# Patient Record
Sex: FEMALE | Race: BLACK OR AFRICAN AMERICAN | NOT HISPANIC OR LATINO | URBAN - METROPOLITAN AREA
[De-identification: names, ages, dates, MRNs, and addresses within clinical notes are randomized per-mention and may not be internally consistent; named-entity substitution may affect disease eponyms.]

---

## 2022-04-11 ENCOUNTER — HISTORICAL (OUTPATIENT)
Dept: ADMINISTRATIVE | Facility: HOSPITAL | Age: 32
End: 2022-04-11

## 2022-04-27 VITALS
BODY MASS INDEX: 26.4 KG/M2 | WEIGHT: 149 LBS | DIASTOLIC BLOOD PRESSURE: 62 MMHG | HEIGHT: 63 IN | SYSTOLIC BLOOD PRESSURE: 110 MMHG

## 2023-04-17 ENCOUNTER — DOCUMENTATION ONLY (OUTPATIENT)
Dept: OBSTETRICS AND GYNECOLOGY | Facility: CLINIC | Age: 33
End: 2023-04-17

## 2023-04-17 ENCOUNTER — OFFICE VISIT (OUTPATIENT)
Dept: OBSTETRICS AND GYNECOLOGY | Facility: CLINIC | Age: 33
End: 2023-04-17
Payer: MEDICAID

## 2023-04-17 VITALS
HEART RATE: 88 BPM | WEIGHT: 177 LBS | HEIGHT: 60 IN | SYSTOLIC BLOOD PRESSURE: 126 MMHG | DIASTOLIC BLOOD PRESSURE: 70 MMHG | BODY MASS INDEX: 34.75 KG/M2

## 2023-04-17 DIAGNOSIS — N92.1 MENORRHAGIA WITH IRREGULAR CYCLE: ICD-10-CM

## 2023-04-17 DIAGNOSIS — N92.6 IRREGULAR MENSTRUATION: ICD-10-CM

## 2023-04-17 DIAGNOSIS — D64.9 ANEMIA, UNSPECIFIED TYPE: ICD-10-CM

## 2023-04-17 DIAGNOSIS — Z01.419 WOMEN'S ANNUAL ROUTINE GYNECOLOGICAL EXAMINATION: Primary | ICD-10-CM

## 2023-04-17 PROCEDURE — 3074F SYST BP LT 130 MM HG: CPT | Mod: CPTII,,, | Performed by: NURSE PRACTITIONER

## 2023-04-17 PROCEDURE — 99395 PR PREVENTIVE VISIT,EST,18-39: ICD-10-PCS | Mod: 24,,, | Performed by: NURSE PRACTITIONER

## 2023-04-17 PROCEDURE — 3074F PR MOST RECENT SYSTOLIC BLOOD PRESSURE < 130 MM HG: ICD-10-PCS | Mod: CPTII,,, | Performed by: NURSE PRACTITIONER

## 2023-04-17 PROCEDURE — 99395 PREV VISIT EST AGE 18-39: CPT | Mod: 24,,, | Performed by: NURSE PRACTITIONER

## 2023-04-17 PROCEDURE — 3078F PR MOST RECENT DIASTOLIC BLOOD PRESSURE < 80 MM HG: ICD-10-PCS | Mod: CPTII,,, | Performed by: NURSE PRACTITIONER

## 2023-04-17 PROCEDURE — 1160F PR REVIEW ALL MEDS BY PRESCRIBER/CLIN PHARMACIST DOCUMENTED: ICD-10-PCS | Mod: CPTII,,, | Performed by: NURSE PRACTITIONER

## 2023-04-17 PROCEDURE — 1159F MED LIST DOCD IN RCRD: CPT | Mod: CPTII,,, | Performed by: NURSE PRACTITIONER

## 2023-04-17 PROCEDURE — 3008F BODY MASS INDEX DOCD: CPT | Mod: CPTII,,, | Performed by: NURSE PRACTITIONER

## 2023-04-17 PROCEDURE — 3078F DIAST BP <80 MM HG: CPT | Mod: CPTII,,, | Performed by: NURSE PRACTITIONER

## 2023-04-17 PROCEDURE — 3008F PR BODY MASS INDEX (BMI) DOCUMENTED: ICD-10-PCS | Mod: CPTII,,, | Performed by: NURSE PRACTITIONER

## 2023-04-17 PROCEDURE — 1159F PR MEDICATION LIST DOCUMENTED IN MEDICAL RECORD: ICD-10-PCS | Mod: CPTII,,, | Performed by: NURSE PRACTITIONER

## 2023-04-17 PROCEDURE — 1160F RVW MEDS BY RX/DR IN RCRD: CPT | Mod: CPTII,,, | Performed by: NURSE PRACTITIONER

## 2023-04-17 RX ORDER — FERROUS SULFATE 325(65) MG
325 TABLET, DELAYED RELEASE (ENTERIC COATED) ORAL
COMMUNITY

## 2023-04-17 RX ORDER — HYDROXYZINE PAMOATE 50 MG/1
100 CAPSULE ORAL 2 TIMES DAILY
COMMUNITY
Start: 2023-02-09

## 2023-04-17 RX ORDER — SERTRALINE HYDROCHLORIDE 25 MG/1
25 TABLET, FILM COATED ORAL
COMMUNITY
Start: 2023-02-09

## 2023-04-17 NOTE — PROGRESS NOTES
Patient ID: [id number]   Chief Complaint: Annual exam  Chief Complaint   Patient presents with    Well Woman     C/O irregular heavy cycles. States she has been experiencing this ongoing, has had blood transfusion. Recently had pelvic US     HPI:   Concetta Hernandez is a 32 y.o. year old  here for her Annual Exam with Dr Rivera, however he is out of clinic due to an emergency. Patient's last menstrual period was 2023. She is doing well. Denies any health changes. C/O irregular heavy cycles. States she has been experiencing this ongoing, has had blood transfusions. Recently had pelvic US, no results available today. Called for results us and labs.    Subjective:     Past Medical History:   Diagnosis Date    Anemia     Anxiety disorder, unspecified     Depression      Past Surgical History:   Procedure Laterality Date    TUBAL LIGATION       Social History     Tobacco Use    Smoking status: Never    Smokeless tobacco: Never   Substance Use Topics    Alcohol use: Never    Drug use: Never     Family History   Problem Relation Age of Onset    Cancer Maternal Grandmother     Cancer Father      OB History    Para Term  AB Living   3 3 3     2   SAB IAB Ectopic Multiple Live Births           2      # Outcome Date GA Lbr Higinio/2nd Weight Sex Delivery Anes PTL Lv   3 Term 18 39w0d    CS-Unspec Spinal     2 Term 17 38w0d  4.082 kg (9 lb) F CS-Unspec Spinal  BRIANNE   1 Term 08/07/15 39w0d  2.807 kg (6 lb 3 oz) F CS-LTranv EPI  BRIANNE       Current Outpatient Medications:     ferrous sulfate 325 (65 FE) MG EC tablet, Take 325 mg by mouth 3 (three) times daily with meals., Disp: , Rfl:     hydrOXYzine pamoate (VISTARIL) 50 MG Cap, Take 100 mg by mouth 2 (two) times daily., Disp: , Rfl:     sertraline (ZOLOFT) 25 MG tablet, Take 25 mg by mouth., Disp: , Rfl:   MENARCHEAL:  Cycle Length: 5 days   Flow: heavy  Dysmenorrhea: Yes  If yes: Severe  Intermenstrual Bleeding: Yes  PAP:  Last PAP: No result  found    History of Abnormal PAP Smear: NO    INTERCOURSE:  Dyspareunia: Yes  Postcoital Bleeding: No  History of STI: No  Current Birth Control Method: none  Sexually Active: yes    Review of Systems 12 point review of systems conducted, negative except as stated in the history of present illness. See HPI for details.  Objective:   Visit Vitals  /70   Pulse 88   Ht 5' (1.524 m)   Wt 80.3 kg (177 lb)   LMP 03/31/2023   BMI 34.57 kg/m²     Physical Exam:  Physical Exam  Constitutional:  General Appearance : alert, in no acute distress, normal, well nourished.  Respiratory:  Respiratory Effort: normal.  Breast:  Right: Inspection/palpation: no discharge, no masses present, no nipple retraction, no skin changes, no skin dimpling, no tenderness, no lymphadenopathy, no axillary mass, no axillary tenderness.  Left: Inspection/palpation: no discharge, no masses present, no nipple retraction, no skin changes, no skin dimpling, no tenderness, no lymphadenopathy, no axillary mass, no axillary tenderness.  Gastrointestinal:  Abdomen: no masses. no tender, nondistended.  Liver and spleen: normal  Hernias: no hernias present, no inguinal adenopathy.  Genitourinary:  External Genitalia: normal, no lesions.  Vagina: normal appearance, no abnormal discharge, no lesions.  Bladder: no mass, nontender.  Urethra: no erythema or lesions present.  Cervix: no lesions, non tender. Pap Done  Uterus: nontender, normal contour, normal mobility, normal size.   Adnexa: no masses, no tenderness.  Anus and Perineum: visually normal.   Chaperone Present    No results found for this or any previous visit (from the past 24 hour(s)).  Assessment/Plan:   Assessment:   Women's annual routine gynecological examination  -     Liquid-Based Pap Smear, Screening Screening    Irregular menstruation    Menorrhagia with irregular cycle    Anemia, unspecified type    RTC Friday discuss POC with Dr Rivera. Spoke to pt about endometrial ablation.  In  addition to their scheduled FU, the patient has also been instructed to follow up on as needed basis  All questions were answered and the patient voiced understanding of the above issues.

## 2023-04-20 LAB — PSYCHE PATHOLOGY RESULT: NORMAL

## 2023-04-21 ENCOUNTER — OFFICE VISIT (OUTPATIENT)
Dept: OBSTETRICS AND GYNECOLOGY | Facility: CLINIC | Age: 33
End: 2023-04-21
Payer: MEDICAID

## 2023-04-21 VITALS
BODY MASS INDEX: 36.68 KG/M2 | WEIGHT: 158.5 LBS | HEART RATE: 90 BPM | DIASTOLIC BLOOD PRESSURE: 60 MMHG | SYSTOLIC BLOOD PRESSURE: 110 MMHG | HEIGHT: 55 IN

## 2023-04-21 DIAGNOSIS — A74.9 CHLAMYDIA: ICD-10-CM

## 2023-04-21 DIAGNOSIS — N92.1 MENORRHAGIA WITH IRREGULAR CYCLE: ICD-10-CM

## 2023-04-21 DIAGNOSIS — N92.6 IRREGULAR MENSTRUATION: Primary | ICD-10-CM

## 2023-04-21 DIAGNOSIS — D64.9 ANEMIA, UNSPECIFIED TYPE: ICD-10-CM

## 2023-04-21 PROCEDURE — 1160F PR REVIEW ALL MEDS BY PRESCRIBER/CLIN PHARMACIST DOCUMENTED: ICD-10-PCS | Mod: CPTII,,, | Performed by: OBSTETRICS & GYNECOLOGY

## 2023-04-21 PROCEDURE — 99213 OFFICE O/P EST LOW 20 MIN: CPT | Mod: ,,, | Performed by: OBSTETRICS & GYNECOLOGY

## 2023-04-21 PROCEDURE — 3074F PR MOST RECENT SYSTOLIC BLOOD PRESSURE < 130 MM HG: ICD-10-PCS | Mod: CPTII,,, | Performed by: OBSTETRICS & GYNECOLOGY

## 2023-04-21 PROCEDURE — 3078F PR MOST RECENT DIASTOLIC BLOOD PRESSURE < 80 MM HG: ICD-10-PCS | Mod: CPTII,,, | Performed by: OBSTETRICS & GYNECOLOGY

## 2023-04-21 PROCEDURE — 3078F DIAST BP <80 MM HG: CPT | Mod: CPTII,,, | Performed by: OBSTETRICS & GYNECOLOGY

## 2023-04-21 PROCEDURE — 99213 PR OFFICE/OUTPT VISIT, EST, LEVL III, 20-29 MIN: ICD-10-PCS | Mod: ,,, | Performed by: OBSTETRICS & GYNECOLOGY

## 2023-04-21 PROCEDURE — 1159F MED LIST DOCD IN RCRD: CPT | Mod: CPTII,,, | Performed by: OBSTETRICS & GYNECOLOGY

## 2023-04-21 PROCEDURE — 3008F BODY MASS INDEX DOCD: CPT | Mod: CPTII,,, | Performed by: OBSTETRICS & GYNECOLOGY

## 2023-04-21 PROCEDURE — 1159F PR MEDICATION LIST DOCUMENTED IN MEDICAL RECORD: ICD-10-PCS | Mod: CPTII,,, | Performed by: OBSTETRICS & GYNECOLOGY

## 2023-04-21 PROCEDURE — 3008F PR BODY MASS INDEX (BMI) DOCUMENTED: ICD-10-PCS | Mod: CPTII,,, | Performed by: OBSTETRICS & GYNECOLOGY

## 2023-04-21 PROCEDURE — 3074F SYST BP LT 130 MM HG: CPT | Mod: CPTII,,, | Performed by: OBSTETRICS & GYNECOLOGY

## 2023-04-21 PROCEDURE — 1160F RVW MEDS BY RX/DR IN RCRD: CPT | Mod: CPTII,,, | Performed by: OBSTETRICS & GYNECOLOGY

## 2023-04-21 RX ORDER — AZITHROMYCIN 500 MG/1
1000 TABLET, FILM COATED ORAL ONCE
Qty: 2 TABLET | Refills: 0 | Status: SHIPPED | OUTPATIENT
Start: 2023-04-21 | End: 2023-04-21

## 2023-04-21 NOTE — PROGRESS NOTES
Patient ID: [id number]   Chief Complaint: FOLLOW-UP (PT PRESENTS TO CLINIC TO DISCUSSED ENDO. ABLATION SX. OTHERWISE NO COMPLAINTS.)    HPI:     Concetta Hernandez is a 32 y.o.  here today for FOLLOW-UP (PT PRESENTS TO CLINIC TO DISCUSSED ENDO. ABLATION SX. OTHERWISE NO COMPLAINTS.)  PT INFORMED OF POSITIVE CHLAMYDIA RESULT AND NEED FOR TREATMENT  U/S: 7.5 X 5.6 X 6.3  ES NL  OVARIES NL  H/H: 7.1/25.2    PT WOULD LIKE TO MOVE FORWARD WITH ENDOMETRIAL ABLATION    Past Medical History:  has a past medical history of Anemia, Anxiety disorder, unspecified, and Depression.    Surgical History:  has a past surgical history that includes Tubal ligation.    Family History: family history includes Cancer in her father and maternal grandmother.    Social History:  reports that she has never smoked. She has never been exposed to tobacco smoke. She has never used smokeless tobacco. She reports that she does not drink alcohol and does not use drugs.    Current Outpatient Medications   Medication Sig Dispense Refill    ferrous sulfate 325 (65 FE) MG EC tablet Take 325 mg by mouth 3 (three) times daily with meals.      hydrOXYzine pamoate (VISTARIL) 50 MG Cap Take 100 mg by mouth 2 (two) times daily.      sertraline (ZOLOFT) 25 MG tablet Take 25 mg by mouth.       No current facility-administered medications for this visit.       Patient has No Known Allergies.     MENARCHEAL:  Cycle Length: 7 days   Flow: heavy  Dysmenorrhea: No  If yes: Moderaye  Intermenstrual Bleeding: No  PAP:  Last PAP: 2023    History of Abnormal PAP Smear: NO  Treated: N/A  HPV Vaccine: NO  INTERCOURSE:  Dyspareunia: No  Postcoital Bleeding: No  History of STI:  YES   If yes, then: CHLAMYDIA  Current Birth Control Method: TUBAL LIGATION  Sexually Active: yes      No results found for this or any previous visit (from the past 24 hour(s)).    Subjective:     Review of Systems    12 point review of systems conducted, negative except as stated in the  history of present illness. See HPI for details.      Objective:     Visit Vitals  /60   Pulse 90   Ht 3' (0.914 m)   Wt 71.9 kg (158 lb 8.2 oz)   LMP 03/31/2023   BMI 85.99 kg/m²       Physical Exam  Constitutional:  General Appearance : alert, in no acute distress, normal, well nourished.  Neck/Thyroid:  Inspection/Palpation: normal. Thyroid: normal size and shape.  Respiratory:  Respiratory Effort: normal.  Gastrointestinal:  Abdomen: no masses. no tender, nondistended.  Liver and spleen: normal  Hernias: no hernias present, no inguinal adenopathy.  Genitourinary:  NO PELVIC EXAM TODAY    Chaperone Present  Assessment:       ICD-10-CM ICD-9-CM   1. Irregular menstruation  N92.6 626.4   2. Menorrhagia with irregular cycle  N92.1 626.2   3. Anemia, unspecified type  D64.9 285.9   4. Chlamydia  A74.9 079.98     Plan   Irregular menstruation    Menorrhagia with irregular cycle    Anemia, unspecified type    Chlamydia  -     azithromycin (ZITHROMAX) 500 MG tablet; Take 2 tablets (1,000 mg total) by mouth once. for 1 dose  Dispense: 2 tablet; Refill: 0    SCHEDULE [place]OMETRIAL [place]BL[place] AT [facility] [place] 2ND  Follow up in about 2 weeks (around 5/5/2023) for SCOTT. In addition to their scheduled follow up, the patient has also been instructed to follow up on as needed basis.     AILYN IRENE MD

## 2023-04-27 ENCOUNTER — OFFICE VISIT (OUTPATIENT)
Dept: OBSTETRICS AND GYNECOLOGY | Facility: CLINIC | Age: 33
End: 2023-04-27
Payer: MEDICAID

## 2023-04-27 VITALS
RESPIRATION RATE: 18 BRPM | DIASTOLIC BLOOD PRESSURE: 62 MMHG | HEART RATE: 81 BPM | SYSTOLIC BLOOD PRESSURE: 110 MMHG | BODY MASS INDEX: 27.81 KG/M2 | HEIGHT: 63 IN | OXYGEN SATURATION: 99 % | WEIGHT: 156.94 LBS

## 2023-04-27 DIAGNOSIS — N92.1 MENORRHAGIA WITH IRREGULAR CYCLE: Primary | ICD-10-CM

## 2023-04-27 DIAGNOSIS — A74.9 CHLAMYDIA: ICD-10-CM

## 2023-04-27 DIAGNOSIS — D64.9 ANEMIA, UNSPECIFIED TYPE: ICD-10-CM

## 2023-04-27 DIAGNOSIS — R87.615 UNSATISFACTORY CERVICAL CYTOLOGY SMEAR: ICD-10-CM

## 2023-04-27 PROCEDURE — 3008F BODY MASS INDEX DOCD: CPT | Mod: CPTII,,, | Performed by: OBSTETRICS & GYNECOLOGY

## 2023-04-27 PROCEDURE — 1160F PR REVIEW ALL MEDS BY PRESCRIBER/CLIN PHARMACIST DOCUMENTED: ICD-10-PCS | Mod: CPTII,,, | Performed by: OBSTETRICS & GYNECOLOGY

## 2023-04-27 PROCEDURE — 3074F PR MOST RECENT SYSTOLIC BLOOD PRESSURE < 130 MM HG: ICD-10-PCS | Mod: CPTII,,, | Performed by: OBSTETRICS & GYNECOLOGY

## 2023-04-27 PROCEDURE — 3078F DIAST BP <80 MM HG: CPT | Mod: CPTII,,, | Performed by: OBSTETRICS & GYNECOLOGY

## 2023-04-27 PROCEDURE — 1159F MED LIST DOCD IN RCRD: CPT | Mod: CPTII,,, | Performed by: OBSTETRICS & GYNECOLOGY

## 2023-04-27 PROCEDURE — 99213 OFFICE O/P EST LOW 20 MIN: CPT | Mod: ,,, | Performed by: OBSTETRICS & GYNECOLOGY

## 2023-04-27 PROCEDURE — 3078F PR MOST RECENT DIASTOLIC BLOOD PRESSURE < 80 MM HG: ICD-10-PCS | Mod: CPTII,,, | Performed by: OBSTETRICS & GYNECOLOGY

## 2023-04-27 PROCEDURE — 99213 PR OFFICE/OUTPT VISIT, EST, LEVL III, 20-29 MIN: ICD-10-PCS | Mod: ,,, | Performed by: OBSTETRICS & GYNECOLOGY

## 2023-04-27 PROCEDURE — 3074F SYST BP LT 130 MM HG: CPT | Mod: CPTII,,, | Performed by: OBSTETRICS & GYNECOLOGY

## 2023-04-27 PROCEDURE — 1159F PR MEDICATION LIST DOCUMENTED IN MEDICAL RECORD: ICD-10-PCS | Mod: CPTII,,, | Performed by: OBSTETRICS & GYNECOLOGY

## 2023-04-27 PROCEDURE — 1160F RVW MEDS BY RX/DR IN RCRD: CPT | Mod: CPTII,,, | Performed by: OBSTETRICS & GYNECOLOGY

## 2023-04-27 PROCEDURE — 3008F PR BODY MASS INDEX (BMI) DOCUMENTED: ICD-10-PCS | Mod: CPTII,,, | Performed by: OBSTETRICS & GYNECOLOGY

## 2023-04-27 RX ORDER — HYDROXYZINE HYDROCHLORIDE 50 MG/1
50 TABLET, FILM COATED ORAL
COMMUNITY
Start: 2023-01-19

## 2023-04-27 NOTE — PROGRESS NOTES
Patient ID: [id number]   Chief Complaint: Pre-op Exam, RE-PAP AND SCOTT.      HPI:     Concetta Hernandez is a 32 y.o.  [place] today [place] [facility] [place] ALSO NEEDS RE-PAP AND SCOTT FOR CHLAMYDIA. PATIENT COMPLETED ZITHROMAX 23. INSTRUCTED ON NPO STATUS AFTER MIDNIGHT NIGHT PRIOR TO PROCEDURE. THE PROCEDURE WAS EXPLAINED IN DETAIL AFTER ALL RISKS, BENEFITS, AND ALTERNATIVES WERE DISCUSSED WITH HER. QUESTIONS WERE ANSWERED. CONSENTS SIGNED.       Past Medical History:  has a past medical history of Anemia, Anxiety disorder, unspecified, Chlamydia, and Depression.    Surgical History:  has a past surgical history that includes Tubal ligation.    Family History: family history includes Cancer in her father and maternal grandmother.    Social History:  reports that she has never smoked. She has never been exposed to tobacco smoke. She has never used smokeless tobacco. She reports that she does not drink alcohol and does not use drugs.    Current Outpatient Medications   Medication Sig Dispense Refill    ferrous sulfate 325 (65 FE) MG EC tablet Take 325 mg by mouth 3 (three) times daily with meals.      hydrOXYzine (ATARAX) 50 MG tablet Take 50 mg by mouth.      polysaccharide iron complex 180 mg iron Cap Take 180 mg by mouth.      sertraline (ZOLOFT) 25 MG tablet Take 25 mg by mouth.      hydrOXYzine pamoate (VISTARIL) 50 MG Cap Take 100 mg by mouth 2 (two) times daily.              Patient has No Known Allergies.     MENARCHEAL:  Cycle Length: 7 days   Flow: heavy  Dysmenorrhea: No  If yes: Moderaye  Intermenstrual Bleeding: No  PAP:  Last PAP: 2023    History of Abnormal PAP Smear: NO  Treated: N/A  HPV Vaccine: NO  INTERCOURSE:  Dyspareunia: No  Postcoital Bleeding: No  History of STI:  YES   If yes, then: CHLAMYDIA  Current Birth Control Method: TUBAL LIGATION  Sexually Active: yes        No results found for this or any previous visit (from the past   "hour(s)).    Subjective:     Review of Systems    12 point review of systems conducted, negative except as stated in the history of present illness. See HPI for details.      Objective:     Visit Vitals  /62 (BP Location: Left arm)   Pulse 81   Resp 18   Ht 5' 3" (1.6 m)   Wt 71.2 kg (156 lb 15.5 oz)   LMP 03/31/2023 (Exact Date)   SpO2 99%   BMI 27.81 kg/m²       Physical Exam  Constitutional:  General Appearance : alert, in no acute distress, normal, well nourished.  Neck/Thyroid:  Inspection/Palpation: normal. Thyroid: normal size and shape.  Respiratory:  Respiratory Effort: normal.  Gastrointestinal:  Abdomen: no masses. no tender, nondistended.  Liver and spleen: normal  Hernias: no hernias present, no inguinal adenopathy.  Genitourinary:  External Genitalia: normal, no lesions.  Vagina: normal appearance, no abnormal discharge, no lesions.  Bladder: no mass, nontender.  Urethra: no erythema or lesions present.  Cervix: no lesions, non tender. PAP AND CXS COLLECTED (SCOTT)  Uterus: nontender, normal contour, normal mobility, normal size.   Adnexa: no masses, no tenderness.  Anus and Perineum: visually normal.   Chaperone Present  Assessment:       ICD-10-CM ICD-9-CM   1. Menorrhagia with irregular cycle  N92.1 626.2   2. Anemia, unspecified type  D64.9 285.9   3. Chlamydia  A74.9 079.98   4. Unsatisfactory cervical cytology smear  R87.615 795.08     Plan   Menorrhagia with irregular cycle    Anemia, unspecified type    Chlamydia  -     MDL Sendout Test    Unsatisfactory cervical cytology smear  -     Liquid-Based Pap Smear, Screening Screening      Follow up in about 2 weeks (around 5/11/2023) for POST OP. In addition to their scheduled follow up, the patient has also been instructed to follow up on as needed basis.     AILYN IRENE MD  "

## 2023-05-01 ENCOUNTER — DOCUMENTATION ONLY (OUTPATIENT)
Dept: OBSTETRICS AND GYNECOLOGY | Facility: CLINIC | Age: 33
End: 2023-05-01
Payer: MEDICAID

## 2023-05-02 ENCOUNTER — OUTSIDE PLACE OF SERVICE (OUTPATIENT)
Dept: ADMINISTRATIVE | Facility: OTHER | Age: 33
End: 2023-05-02
Payer: MEDICAID

## 2023-05-02 PROCEDURE — 58353 PR ENDOMETRIAL ABLATION, THERMAL: ICD-10-PCS | Mod: ,,, | Performed by: OBSTETRICS & GYNECOLOGY

## 2023-05-02 PROCEDURE — 58353 ENDOMETR ABLATE THERMAL: CPT | Mod: ,,, | Performed by: OBSTETRICS & GYNECOLOGY

## 2023-05-03 ENCOUNTER — DOCUMENTATION ONLY (OUTPATIENT)
Dept: OBSTETRICS AND GYNECOLOGY | Facility: CLINIC | Age: 33
End: 2023-05-03
Payer: MEDICAID

## 2023-05-03 LAB — PSYCHE PATHOLOGY RESULT: NORMAL

## 2023-05-16 ENCOUNTER — OFFICE VISIT (OUTPATIENT)
Dept: OBSTETRICS AND GYNECOLOGY | Facility: CLINIC | Age: 33
End: 2023-05-16
Payer: MEDICAID

## 2023-05-16 VITALS
DIASTOLIC BLOOD PRESSURE: 70 MMHG | HEIGHT: 63 IN | WEIGHT: 157 LBS | HEART RATE: 84 BPM | BODY MASS INDEX: 27.82 KG/M2 | SYSTOLIC BLOOD PRESSURE: 116 MMHG

## 2023-05-16 DIAGNOSIS — Z09 POSTOP CHECK: Primary | ICD-10-CM

## 2023-05-16 PROCEDURE — 3074F PR MOST RECENT SYSTOLIC BLOOD PRESSURE < 130 MM HG: ICD-10-PCS | Mod: CPTII,,, | Performed by: NURSE PRACTITIONER

## 2023-05-16 PROCEDURE — 1160F RVW MEDS BY RX/DR IN RCRD: CPT | Mod: CPTII,,, | Performed by: NURSE PRACTITIONER

## 2023-05-16 PROCEDURE — 3074F SYST BP LT 130 MM HG: CPT | Mod: CPTII,,, | Performed by: NURSE PRACTITIONER

## 2023-05-16 PROCEDURE — 1159F PR MEDICATION LIST DOCUMENTED IN MEDICAL RECORD: ICD-10-PCS | Mod: CPTII,,, | Performed by: NURSE PRACTITIONER

## 2023-05-16 PROCEDURE — 3078F PR MOST RECENT DIASTOLIC BLOOD PRESSURE < 80 MM HG: ICD-10-PCS | Mod: CPTII,,, | Performed by: NURSE PRACTITIONER

## 2023-05-16 PROCEDURE — 99024 POSTOP FOLLOW-UP VISIT: CPT | Mod: ,,, | Performed by: NURSE PRACTITIONER

## 2023-05-16 PROCEDURE — 1160F PR REVIEW ALL MEDS BY PRESCRIBER/CLIN PHARMACIST DOCUMENTED: ICD-10-PCS | Mod: CPTII,,, | Performed by: NURSE PRACTITIONER

## 2023-05-16 PROCEDURE — 3008F BODY MASS INDEX DOCD: CPT | Mod: CPTII,,, | Performed by: NURSE PRACTITIONER

## 2023-05-16 PROCEDURE — 99024 PR POST-OP FOLLOW-UP VISIT: ICD-10-PCS | Mod: ,,, | Performed by: NURSE PRACTITIONER

## 2023-05-16 PROCEDURE — 1159F MED LIST DOCD IN RCRD: CPT | Mod: CPTII,,, | Performed by: NURSE PRACTITIONER

## 2023-05-16 PROCEDURE — 3008F PR BODY MASS INDEX (BMI) DOCUMENTED: ICD-10-PCS | Mod: CPTII,,, | Performed by: NURSE PRACTITIONER

## 2023-05-16 PROCEDURE — 3078F DIAST BP <80 MM HG: CPT | Mod: CPTII,,, | Performed by: NURSE PRACTITIONER

## 2023-05-16 NOTE — PROGRESS NOTES
"  Subjective:      Concetta Hernandez is a 33 y.o.  who presents to the clinic 2 weeks status post endometrial ablation. The patient is not having any pain, bleeding, difficulty voiding or stooling. Denies discharge. Post-op Evaluation (2 week Endo Ablation. No C/Os)      Past Medical History:   Diagnosis Date    Anemia     Anxiety disorder, unspecified     Chlamydia     Depression      Past Surgical History:   Procedure Laterality Date    ABLATION      TUBAL LIGATION       Review of patient's allergies indicates:  No Known Allergies  OB History    Para Term  AB Living   3 3 3     3   SAB IAB Ectopic Multiple Live Births           3      # Outcome Date GA Lbr Higinio/2nd Weight Sex Delivery Anes PTL Lv   3 Term 18 39w0d    CS-Unspec Spinal     2 Term 17 38w0d  4.082 kg (9 lb) F CS-Unspec Spinal  BRIANNE   1 Term 08/07/15 39w0d  2.807 kg (6 lb 3 oz) F CS-LTranv EPI  BRIANNE     Social History     Tobacco Use    Smoking status: Never     Passive exposure: Never    Smokeless tobacco: Never   Substance Use Topics    Alcohol use: Never    Drug use: Never     Family History   Problem Relation Age of Onset    Cancer Maternal Grandmother     Cancer Father        Current Outpatient Medications:     ferrous sulfate 325 (65 FE) MG EC tablet, Take 325 mg by mouth 3 (three) times daily with meals., Disp: , Rfl:     hydrOXYzine (ATARAX) 50 MG tablet, Take 50 mg by mouth., Disp: , Rfl:     sertraline (ZOLOFT) 25 MG tablet, Take 25 mg by mouth., Disp: , Rfl:     hydrOXYzine pamoate (VISTARIL) 50 MG Cap, Take 100 mg by mouth 2 (two) times daily., Disp: , Rfl:     polysaccharide iron complex 180 mg iron Cap, Take 180 mg by mouth., Disp: , Rfl:       A comprehensive review of symptoms was completed and negative except as noted above.  Objective:     /70   Pulse 84   Ht 5' 3" (1.6 m)   Wt 71.2 kg (157 lb)   LMP 2023 (Exact Date)   BMI 27.81 kg/m²     General Appearance: alert, in no acute distress, " normal, well nourished.    Gastrointestinal:  Abdomen: no masses. no tender, nondistended.  Liver and spleen: normal  Hernias: no hernias present, no inguinal adenopathy.    Wound Site: clean, dry and intact without erythema or induration.     Pathology:      Assessment:     Postop check         No results found for this or any previous visit (from the past 24 hour(s)).    Plan:   Continue any current medications.  Wound care discussed.  Activity restrictions: none  Anticipated return to work: not applicable.  Follow up Follow up in about 2 weeks (around 5/30/2023) for PostOP. In addition to her scheduled follow up, the pt has also been instructed to follow up on as needed basis.

## 2023-07-19 ENCOUNTER — PATIENT MESSAGE (OUTPATIENT)
Dept: RESEARCH | Facility: HOSPITAL | Age: 33
End: 2023-07-19
Payer: MEDICAID